# Patient Record
Sex: MALE | Race: WHITE
[De-identification: names, ages, dates, MRNs, and addresses within clinical notes are randomized per-mention and may not be internally consistent; named-entity substitution may affect disease eponyms.]

---

## 2020-11-19 ENCOUNTER — HOSPITAL ENCOUNTER (OUTPATIENT)
Dept: HOSPITAL 76 - LAB | Age: 70
Discharge: HOME | End: 2020-11-19
Attending: FAMILY MEDICINE
Payer: MEDICARE

## 2020-11-19 DIAGNOSIS — E29.1: ICD-10-CM

## 2020-11-19 DIAGNOSIS — E55.9: Primary | ICD-10-CM

## 2020-11-19 PROCEDURE — 36415 COLL VENOUS BLD VENIPUNCTURE: CPT

## 2020-11-19 PROCEDURE — 82306 VITAMIN D 25 HYDROXY: CPT

## 2020-11-19 PROCEDURE — 84403 ASSAY OF TOTAL TESTOSTERONE: CPT

## 2021-05-07 ENCOUNTER — HOSPITAL ENCOUNTER (OUTPATIENT)
Dept: HOSPITAL 76 - LAB | Age: 71
Discharge: HOME | End: 2021-05-07
Attending: ORTHOPAEDIC SURGERY
Payer: MEDICARE

## 2021-05-07 ENCOUNTER — HOSPITAL ENCOUNTER (OUTPATIENT)
Dept: HOSPITAL 76 - COV | Age: 71
Discharge: HOME | End: 2021-05-07
Attending: ORTHOPAEDIC SURGERY
Payer: MEDICARE

## 2021-05-07 DIAGNOSIS — Z01.812: Primary | ICD-10-CM

## 2021-05-07 DIAGNOSIS — Z20.822: ICD-10-CM

## 2021-05-07 DIAGNOSIS — M19.041: ICD-10-CM

## 2021-05-07 LAB
ANION GAP SERPL CALCULATED.4IONS-SCNC: 9 MMOL/L
BUN SERPL-MCNC: 23 MG/DL
CALCIUM UR-MCNC: 9.7 MG/DL
CHLORIDE SERPL-SCNC: 110 MMOL/L
CO2 SERPL-SCNC: 24 MMOL/L
CREAT SERPLBLD-SCNC: 1 MG/DL
GFRSERPLBLD MDRD-ARVRAT: 74 ML/MIN/{1.73_M2}
GLUCOSE SERPL-MCNC: 161 MG/DL
POTASSIUM SERPL-SCNC: 4 MMOL/L
SODIUM SERPLBLD-SCNC: 143 MMOL/L

## 2021-05-07 PROCEDURE — 80048 BASIC METABOLIC PNL TOTAL CA: CPT

## 2021-05-07 PROCEDURE — 36415 COLL VENOUS BLD VENIPUNCTURE: CPT

## 2021-08-10 ENCOUNTER — HOSPITAL ENCOUNTER (OUTPATIENT)
Dept: HOSPITAL 76 - COV | Age: 71
Discharge: HOME | End: 2021-08-10
Attending: FAMILY MEDICINE
Payer: MEDICARE

## 2021-08-10 DIAGNOSIS — R09.81: ICD-10-CM

## 2021-08-10 DIAGNOSIS — R19.7: ICD-10-CM

## 2021-08-10 DIAGNOSIS — Z20.822: ICD-10-CM

## 2021-08-10 DIAGNOSIS — J34.89: ICD-10-CM

## 2021-08-10 DIAGNOSIS — R68.83: ICD-10-CM

## 2021-08-10 DIAGNOSIS — R07.0: ICD-10-CM

## 2021-08-10 DIAGNOSIS — M79.10: Primary | ICD-10-CM

## 2021-08-10 DIAGNOSIS — R43.8: ICD-10-CM

## 2021-09-08 ENCOUNTER — HOSPITAL ENCOUNTER (EMERGENCY)
Dept: HOSPITAL 76 - ED | Age: 71
Discharge: HOME | End: 2021-09-08
Payer: MEDICARE

## 2021-09-08 VITALS — SYSTOLIC BLOOD PRESSURE: 132 MMHG | DIASTOLIC BLOOD PRESSURE: 76 MMHG

## 2021-09-08 DIAGNOSIS — L27.1: Primary | ICD-10-CM

## 2021-09-08 LAB
ALBUMIN DIAFP-MCNC: 4.3 G/DL (ref 3.2–5.5)
ALBUMIN/GLOB SERPL: 1.6 {RATIO} (ref 1–2.2)
ALP SERPL-CCNC: 56 IU/L (ref 42–121)
ALT SERPL W P-5'-P-CCNC: 26 IU/L (ref 10–60)
ANION GAP SERPL CALCULATED.4IONS-SCNC: 14 MMOL/L (ref 6–13)
AST SERPL W P-5'-P-CCNC: 19 IU/L (ref 10–42)
BASOPHILS NFR BLD AUTO: 0 10^3/UL (ref 0–0.1)
BASOPHILS NFR BLD AUTO: 0.2 %
BILIRUB BLD-MCNC: 0.7 MG/DL (ref 0.2–1)
BUN SERPL-MCNC: 30 MG/DL (ref 6–20)
CALCIUM UR-MCNC: 9.8 MG/DL (ref 8.5–10.3)
CHLORIDE SERPL-SCNC: 101 MMOL/L (ref 101–111)
CLARITY UR REFRACT.AUTO: CLEAR
CO2 SERPL-SCNC: 21 MMOL/L (ref 21–32)
CREAT SERPLBLD-SCNC: 1.3 MG/DL (ref 0.6–1.2)
CRP SERPL-MCNC: 2.6 MG/DL (ref 0–1)
EOSINOPHIL # BLD AUTO: 0.7 10^3/UL (ref 0–0.7)
EOSINOPHIL NFR BLD AUTO: 4 %
ERYTHROCYTE [DISTWIDTH] IN BLOOD BY AUTOMATED COUNT: 13 % (ref 12–15)
GFRSERPLBLD MDRD-ARVRAT: 55 ML/MIN/{1.73_M2} (ref 89–?)
GLOBULIN SER-MCNC: 2.7 G/DL (ref 2.1–4.2)
GLUCOSE SERPL-MCNC: 185 MG/DL (ref 70–100)
GLUCOSE UR QL STRIP.AUTO: NEGATIVE MG/DL
HCT VFR BLD AUTO: 47.7 % (ref 42–52)
HGB UR QL STRIP: 15.8 G/DL (ref 14–18)
KETONES UR QL STRIP.AUTO: NEGATIVE MG/DL
LIPASE SERPL-CCNC: 23 U/L (ref 22–51)
LYMPHOCYTES # SPEC AUTO: 0.8 10^3/UL (ref 1.5–3.5)
LYMPHOCYTES NFR BLD AUTO: 4.7 %
MCH RBC QN AUTO: 30 PG (ref 27–31)
MCHC RBC AUTO-ENTMCNC: 33.1 G/DL (ref 32–36)
MCV RBC AUTO: 90.5 FL (ref 80–94)
MONOCYTES # BLD AUTO: 0.3 10^3/UL (ref 0–1)
MONOCYTES NFR BLD AUTO: 1.5 %
NEUTROPHILS # BLD AUTO: 15.6 10^3/UL (ref 1.5–6.6)
NEUTROPHILS # SNV AUTO: 17.6 X10^3/UL (ref 4.8–10.8)
NEUTROPHILS NFR BLD AUTO: 88.9 %
NITRITE UR QL STRIP.AUTO: NEGATIVE
NRBC # BLD AUTO: 0 /100WBC
NRBC # BLD AUTO: 0 X10^3/UL
PDW BLD AUTO: 10.9 FL (ref 7.4–11.4)
PH UR STRIP.AUTO: 6 PH (ref 5–7.5)
PLATELET # BLD: 239 10^3/UL (ref 130–450)
POTASSIUM SERPL-SCNC: 4 MMOL/L (ref 3.5–5)
PROT SPEC-MCNC: 7 G/DL (ref 6.7–8.2)
PROT UR STRIP.AUTO-MCNC: NEGATIVE MG/DL
RBC # UR STRIP.AUTO: NEGATIVE /UL
RBC MAR: 5.27 10^6/UL (ref 4.7–6.1)
SODIUM SERPLBLD-SCNC: 136 MMOL/L (ref 135–145)
SP GR UR STRIP.AUTO: 1.02 (ref 1–1.03)
UROBILINOGEN UR QL STRIP.AUTO: (no result) E.U./DL
UROBILINOGEN UR STRIP.AUTO-MCNC: NEGATIVE MG/DL

## 2021-09-08 PROCEDURE — 81001 URINALYSIS AUTO W/SCOPE: CPT

## 2021-09-08 PROCEDURE — 83690 ASSAY OF LIPASE: CPT

## 2021-09-08 PROCEDURE — 96360 HYDRATION IV INFUSION INIT: CPT

## 2021-09-08 PROCEDURE — 99284 EMERGENCY DEPT VISIT MOD MDM: CPT

## 2021-09-08 PROCEDURE — 87086 URINE CULTURE/COLONY COUNT: CPT

## 2021-09-08 PROCEDURE — 86140 C-REACTIVE PROTEIN: CPT

## 2021-09-08 PROCEDURE — 36415 COLL VENOUS BLD VENIPUNCTURE: CPT

## 2021-09-08 PROCEDURE — 81003 URINALYSIS AUTO W/O SCOPE: CPT

## 2021-09-08 PROCEDURE — 80053 COMPREHEN METABOLIC PANEL: CPT

## 2021-09-08 PROCEDURE — 85651 RBC SED RATE NONAUTOMATED: CPT

## 2021-09-08 PROCEDURE — 85025 COMPLETE CBC W/AUTO DIFF WBC: CPT

## 2021-09-08 NOTE — XRAY REPORT
PROCEDURE:  Chest 1 View X-Ray

 

INDICATIONS:  chest pain

 

TECHNIQUE:  One view of the chest was acquired.  

 

COMPARISON:  Chest x-ray 8/6/2020

 

FINDINGS:  

 

Surgical changes and devices:  None.  

 

Lungs and pleura:  No pleural effusions or pneumothorax.  Lungs are clear.  

 

Mediastinum:  Mediastinal contours appear normal.  Heart size is normal.  

 

Bones and chest wall:  No suspicious bony lesions.  Overlying soft tissues appear unremarkable.  

 

IMPRESSION:  

No acute pulmonary process.

 

Reviewed by: Heidy Villafana MD on 9/8/2021 9:43 PM PDT

Approved by: Heidy Villafana MD on 9/8/2021 9:43 PM PDT

 

 

Station ID:  [id number]

## 2021-09-08 NOTE — ED PHYSICIAN DOCUMENTATION
History of Present Illness





- Stated complaint


Stated Complaint: ALLERGIC REACTION





- Chief complaint


Chief Complaint: Allergic Rx





- Additonal information


Additional information: 





71-year-old male presents the emergency department for evaluation of a diffuse 

rash.  He unfortunately has a history of C. difficile diarrhea.  Due to 

medication allergies that include an allergy to vancomycin he completed a 10-day

course of Dificid on 3 September.  He began to develop an erythematous, pruritic

and macular rash on his legs starting the fourth.  Since then this rash has 

marched unabated.  He now has a diffuse macular rash on his torso and legs, 

palms of hands and soles of feet.  no blistering. He describes intense pruritus.

 He did speak with his PCP via Zoom visit on the fifth and had a prednisone 

taper prescribed.  He is also taking pepcid, zyrtec and benadryl. He did fill 

the prednisone prescription yesterday but despite this the rash is worsening.





He has no fevers no cough.  No mucosal or eye involvement.  He reports that over

the last 3 days he has not been urinating well and has gained 12 pounds.





He denies chest pain or shortness of air.





Past medical history is most significant for hypertension, C. difficile 

diarrhea.





Review of Systems


Constitutional: denies: Fever


Eyes: reports: Reviewed and negative


Ears: reports: Reviewed and negative


Nose: reports: Reviewed and negative


Throat: reports: Reviewed and negative


Cardiac: reports: Reviewed and negative


Respiratory: reports: Reviewed and negative


GI: reports: Diarrhea (History of C. difficile diarrhea)


: reports: Reviewed and negative


Skin: reports: Rash


Musculoskeletal: reports: Reviewed and negative


Neurologic: reports: Reviewed and negative





PD PAST MEDICAL HISTORY





- Present Medications


Home Medications: 


                                Ambulatory Orders











 Medication  Instructions  Recorded  Confirmed


 


hydrOXYzine PAMOATE [Vistaril] 25 mg PO Q6H PRN #30 09/08/21 














- Allergies


Allergies/Adverse Reactions: 


                                    Allergies











Allergy/AdvReac Type Severity Reaction Status Date / Time


 


cephalexin [From Keflex] Allergy  Rash Verified 09/08/21 19:31


 


fidaxomicin [From Dificid] Allergy  Rash Verified 09/08/21 19:31


 


tobramycin Allergy  Unknown Verified 09/08/21 19:31


 


vancomycin Allergy  Unknown Verified 09/08/21 19:31














PD ED PE EXPANDED





- General


General: Alert, No acute distress, Well developed/nourished





- Cardiac


Cardiac: Regular Rate, Radial strong equal, Pedal strong equal, Cap refill < 2 

sec.  No: Murmur Present





- Respiratory


Respiratory: Clear to ausultation jenniffer.  No: Distress, Labored





- Abdomen


Abdomen: Normal Bowel sounds.  No: Tender to palpation





- Derm


Derm: Rash, Other (diffuse macular pruritic rash on arms, toso, legs, abdomen.  

No intraoral involvment.   negative nickolsky )





- Extremities


Extremities: Normal.  No: Deformity, Tenderness





- Neuro


Neuro: Alert and Oriented X 3, CNII-XII intact





- GCS


Eye Opening: Spontaneous


Motor: Obeys Commands


Verbal: Oriented


Total: 15





Results





- Vitals


Vitals: 


                               Vital Signs - 24 hr











  09/08/21





  19:31


 


Temperature 37.3 C


 


Heart Rate 96


 


Respiratory 18





Rate 


 


Blood Pressure 132/110 H


 


O2 Saturation 96








                                     Oxygen











O2 Source                      Room air

















- Labs


Labs: 


                                Laboratory Tests











  09/08/21 09/08/21 09/08/21





  19:54 19:57 19:57


 


WBC   17.6 H 


 


RBC   5.27 


 


Hgb   15.8 


 


Hct   47.7 


 


MCV   90.5 


 


MCH   30.0 


 


MCHC   33.1 


 


RDW   13.0 


 


Plt Count   239 


 


MPV   10.9 


 


Neut # (Auto)   15.6 H 


 


Lymph # (Auto)   0.8 L 


 


Mono # (Auto)   0.3 


 


Eos # (Auto)   0.7 


 


Baso # (Auto)   0.0 


 


Absolute Nucleated RBC   0.00 


 


Nucleated RBC %   0.0 


 


ESR    1


 


Sodium   


 


Potassium   


 


Chloride   


 


Carbon Dioxide   


 


Anion Gap   


 


BUN   


 


Creatinine   


 


Estimated GFR (MDRD)   


 


Glucose   


 


Calcium   


 


Total Bilirubin   


 


AST   


 


ALT   


 


Alkaline Phosphatase   


 


C-Reactive Protein   


 


Total Protein   


 


Albumin   


 


Globulin   


 


Albumin/Globulin Ratio   


 


Lipase   


 


Urine Color  YELLOW  


 


Urine Clarity  CLEAR  


 


Urine pH  6.0  


 


Ur Specific Gravity  1.020  


 


Urine Protein  NEGATIVE  


 


Urine Glucose (UA)  NEGATIVE  


 


Urine Ketones  NEGATIVE  


 


Urine Occult Blood  NEGATIVE  


 


Urine Nitrite  NEGATIVE  


 


Urine Bilirubin  NEGATIVE  


 


Urine Urobilinogen  0.2 (NORMAL)  


 


Ur Leukocyte Esterase  NEGATIVE  


 


Ur Microscopic Review  NOT INDICATED  


 


Urine Culture Comments  NOT INDICATED  














  09/08/21





  19:57


 


WBC 


 


RBC 


 


Hgb 


 


Hct 


 


MCV 


 


MCH 


 


MCHC 


 


RDW 


 


Plt Count 


 


MPV 


 


Neut # (Auto) 


 


Lymph # (Auto) 


 


Mono # (Auto) 


 


Eos # (Auto) 


 


Baso # (Auto) 


 


Absolute Nucleated RBC 


 


Nucleated RBC % 


 


ESR 


 


Sodium  136


 


Potassium  4.0


 


Chloride  101


 


Carbon Dioxide  21


 


Anion Gap  14.0 H


 


BUN  30 H


 


Creatinine  1.3 H


 


Estimated GFR (MDRD)  55 L


 


Glucose  185 H


 


Calcium  9.8


 


Total Bilirubin  0.7


 


AST  19


 


ALT  26


 


Alkaline Phosphatase  56


 


C-Reactive Protein  2.6 H


 


Total Protein  7.0


 


Albumin  4.3


 


Globulin  2.7


 


Albumin/Globulin Ratio  1.6


 


Lipase  23


 


Urine Color 


 


Urine Clarity 


 


Urine pH 


 


Ur Specific Gravity 


 


Urine Protein 


 


Urine Glucose (UA) 


 


Urine Ketones 


 


Urine Occult Blood 


 


Urine Nitrite 


 


Urine Bilirubin 


 


Urine Urobilinogen 


 


Ur Leukocyte Esterase 


 


Ur Microscopic Review 


 


Urine Culture Comments 














PD MEDICAL DECISION MAKING





- ED course


Complexity details: reviewed results, considered differential, d/w patient


ED course: 





71-year-old male presents emergency department for fixed drug eruption that 

began 5 days ago after he completed a 10-day course of Dificid for C. difficile 

diarrhea.  This rash is blanchable with a negative Nikolsky's.  Is generalized 

macular and intensely pruritic.  Patient had reported 12 pound weight gain over 

the last 2 days and reduced urinary output.  However his UA shows no 

proteinuria.  There are no signs of infection.  Screening labs show a moderate 

leukocytosis which I think is likely marginalization.  Given lack of fevers or 

findings of cellulitis low suspicion for infection.  Screening labs do show a 

BUN of 30 and a creatinine of 1.3.  He is mildly dehydrated and was repleted 

with 1 L of fluids.





His primary care provider has put him on a rather long prednisone taper that he 

just started yesterday.  At this time given the lack of oral mucosal 

involvement, negative Nikolsky's and an otherwise reassuring exam no further 

treatment is necessary.  He does not seem to have HSP or findings of renal 

involvement.  I have encouraged him to continue the prednisone taper.  He will 

be referred to dermatology where a skin punch biopsy can be helpful in further 

differentiation of this rash.  I will start him on Vistaril to help with the 

itch.





Emergent return precautions were discussed for worsening rash with blistering, 

any oral mucosal involvement, chest pain or shortness of air.





Departure





- Departure


Disposition: 01 Home, Self Care


Clinical Impression: 


 Fixed drug eruption





Condition: Stable


Record reviewed to determine appropriate education?: Yes


Instructions:  ED Drug React Allergic


Prescriptions: 


hydrOXYzine PAMOATE [Vistaril] 25 mg PO Q6H PRN #30


 PRN Reason: Itching


Comments: 


Gavinbola erica were seen in the emergency department today for A rash that 

developed after you finished the recent antibiotic for your C. difficile.  At 

this time it is important that you continue the full course of the steroid 

taper.  I would like you to continue taking cooler showers to help with the 

itch.  You can try any over-the-counter ointment such as Benadryl or 

hydrocortisone.





I am prescribing Vistaril but this is often more effective at relieving itch 

than Benadryl.  Do not take the Benadryl if you are taking the Vistaril.  This 

could cause excessive sedation.





It is important that you follow-up with your primary care provider as well as a 

dermatologist.  A punch biopsy can be completed to help determine the full 

etiology of this rash.





If at any point you feel that the rash is worsening, you have labored breathing,

 you develop blisters or sores in your mouth, or your rash begins to blister on 

the outside please return immediately to the ER for a second look.








[facility]


Skin care, Dermatologist


[address] [place]  [phone]








[facility] [address] [address]


[phone]

## 2023-01-06 ENCOUNTER — HOSPITAL ENCOUNTER (OUTPATIENT)
Dept: HOSPITAL 76 - DI.S | Age: 73
Discharge: HOME | End: 2023-01-06
Attending: PHYSICIAN ASSISTANT
Payer: MEDICARE

## 2023-01-06 DIAGNOSIS — R05.8: Primary | ICD-10-CM

## 2023-01-06 NOTE — XRAY REPORT
PROCEDURE:  Chest 2 View X-Ray

 

INDICATIONS:  PRODUCTIVE COUGH

 

TECHNIQUE:  2 views of the chest were acquired.  

 

COMPARISON:  9/8/2021 and 8/6/2020

 

FINDINGS:  

 

Surgical changes and devices: Postsurgical changes are again seen in left glenoid.  

 

Lungs and pleura:  No pleural effusions or pneumothorax.  Lungs are clear.  

 

Mediastinum:  Mediastinal contours are normal.  Heart size is normal.  

 

Bones and chest wall:  No suspicious bony abnormalities.  Soft tissues appear unremarkable.  

 

IMPRESSION:  

No acute cardiopulmonary pathology.

 

Reviewed by: Anastacio Blum MD on 1/6/2023 10:39 AM PST

Approved by: Anastacio Blum MD on 1/6/2023 10:39 AM [facility]

 

 

Station ID:  [id number]